# Patient Record
Sex: FEMALE | Race: BLACK OR AFRICAN AMERICAN | NOT HISPANIC OR LATINO | Employment: FULL TIME | URBAN - METROPOLITAN AREA
[De-identification: names, ages, dates, MRNs, and addresses within clinical notes are randomized per-mention and may not be internally consistent; named-entity substitution may affect disease eponyms.]

---

## 2024-12-23 ENCOUNTER — HOSPITAL ENCOUNTER (EMERGENCY)
Facility: HOSPITAL | Age: 59
Discharge: HOME | End: 2024-12-23
Attending: EMERGENCY MEDICINE
Payer: COMMERCIAL

## 2024-12-23 ENCOUNTER — APPOINTMENT (OUTPATIENT)
Dept: RADIOLOGY | Facility: HOSPITAL | Age: 59
End: 2024-12-23
Payer: COMMERCIAL

## 2024-12-23 VITALS
HEART RATE: 63 BPM | WEIGHT: 181 LBS | OXYGEN SATURATION: 99 % | DIASTOLIC BLOOD PRESSURE: 96 MMHG | SYSTOLIC BLOOD PRESSURE: 204 MMHG | HEIGHT: 68 IN | BODY MASS INDEX: 27.43 KG/M2 | TEMPERATURE: 98 F | RESPIRATION RATE: 16 BRPM
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
## 2024-12-23 DIAGNOSIS — K04.7 PERIAPICAL ABSCESS: ICD-10-CM

## 2024-12-23 DIAGNOSIS — R68.84 JAW PAIN: ICD-10-CM

## 2024-12-23 PROCEDURE — 99284 EMERGENCY DEPT VISIT MOD MDM: CPT | Mod: 25 | Performed by: EMERGENCY MEDICINE

## 2024-12-23 PROCEDURE — 70486 CT MAXILLOFACIAL W/O DYE: CPT

## 2024-12-23 PROCEDURE — 2500000001 HC RX 250 WO HCPCS SELF ADMINISTERED DRUGS (ALT 637 FOR MEDICARE OP)

## 2024-12-23 PROCEDURE — 76376 3D RENDER W/INTRP POSTPROCES: CPT

## 2024-12-23 PROCEDURE — 2500000004 HC RX 250 GENERAL PHARMACY W/ HCPCS (ALT 636 FOR OP/ED)

## 2024-12-23 PROCEDURE — 72125 CT NECK SPINE W/O DYE: CPT

## 2024-12-23 PROCEDURE — 96374 THER/PROPH/DIAG INJ IV PUSH: CPT

## 2024-12-23 PROCEDURE — 73610 X-RAY EXAM OF ANKLE: CPT | Mod: LT

## 2024-12-23 PROCEDURE — 70450 CT HEAD/BRAIN W/O DYE: CPT

## 2024-12-23 PROCEDURE — 73610 X-RAY EXAM OF ANKLE: CPT | Mod: LEFT SIDE | Performed by: RADIOLOGY

## 2024-12-23 RX ORDER — ACETAMINOPHEN 325 MG/1
975 TABLET ORAL ONCE
Status: COMPLETED | OUTPATIENT
Start: 2024-12-23 | End: 2024-12-23

## 2024-12-23 RX ORDER — METHOCARBAMOL 500 MG/1
500 TABLET, FILM COATED ORAL 4 TIMES DAILY
Qty: 60 TABLET | Refills: 0 | Status: SHIPPED | OUTPATIENT
Start: 2024-12-23

## 2024-12-23 RX ORDER — METHOCARBAMOL 100 MG/ML
1000 INJECTION, SOLUTION INTRAMUSCULAR; INTRAVENOUS ONCE
Status: COMPLETED | OUTPATIENT
Start: 2024-12-23 | End: 2024-12-23

## 2024-12-23 RX ORDER — PENICILLIN V POTASSIUM 500 MG/1
500 TABLET, FILM COATED ORAL 4 TIMES DAILY
Qty: 28 TABLET | Refills: 0 | Status: SHIPPED | OUTPATIENT
Start: 2024-12-23 | End: 2025-01-01

## 2024-12-23 ASSESSMENT — COLUMBIA-SUICIDE SEVERITY RATING SCALE - C-SSRS
1. IN THE PAST MONTH, HAVE YOU WISHED YOU WERE DEAD OR WISHED YOU COULD GO TO SLEEP AND NOT WAKE UP?: NO
2. HAVE YOU ACTUALLY HAD ANY THOUGHTS OF KILLING YOURSELF?: NO
6. HAVE YOU EVER DONE ANYTHING, STARTED TO DO ANYTHING, OR PREPARED TO DO ANYTHING TO END YOUR LIFE?: NO

## 2024-12-23 ASSESSMENT — PAIN - FUNCTIONAL ASSESSMENT
PAIN_FUNCTIONAL_ASSESSMENT: 0-10
PAIN_FUNCTIONAL_ASSESSMENT: 0-10

## 2024-12-23 ASSESSMENT — PAIN DESCRIPTION - PROGRESSION: CLINICAL_PROGRESSION: NOT CHANGED

## 2024-12-23 ASSESSMENT — PAIN DESCRIPTION - PAIN TYPE: TYPE: ACUTE PAIN

## 2024-12-23 ASSESSMENT — LIFESTYLE VARIABLES
EVER HAD A DRINK FIRST THING IN THE MORNING TO STEADY YOUR NERVES TO GET RID OF A HANGOVER: NO
TOTAL SCORE: 0
HAVE PEOPLE ANNOYED YOU BY CRITICIZING YOUR DRINKING: NO
HAVE YOU EVER FELT YOU SHOULD CUT DOWN ON YOUR DRINKING: NO
EVER FELT BAD OR GUILTY ABOUT YOUR DRINKING: NO

## 2024-12-23 ASSESSMENT — PAIN DESCRIPTION - LOCATION: LOCATION: HEAD

## 2024-12-23 ASSESSMENT — PAIN DESCRIPTION - ORIENTATION: ORIENTATION: POSTERIOR

## 2024-12-23 ASSESSMENT — PAIN SCALES - GENERAL
PAINLEVEL_OUTOF10: 10 - WORST POSSIBLE PAIN
PAINLEVEL_OUTOF10: 3
PAINLEVEL_OUTOF10: 10 - WORST POSSIBLE PAIN

## 2024-12-23 NOTE — ED PROVIDER NOTES
History of Present Illness     History provided by: Patient and EMS  Limitations to History: None  External Records Reviewed with Brief Summary:  previous evaluations at [facility]    HPI:  Akila Cross is a 59 y.o. female past medical history of hypertension TIAs who presents today for mechanical fall.  Patient states that she was standing on a chair attempting to do something on the ceiling when she stepped down off the chair and believes she stepped on one of her grandchildren shoes.  She states that she then felt her foot folded in underneath her and then she fell backward striking her head.  She denies any loss of consciousness, however, did become very very dizzy after this happened.  She states the dizziness seems to be improving, however, she does still have an occipital headache.  She denies any blurry vision, difficulty swallowing or speaking.  She denies any numbness weakness or tingling in arms or legs.  She does endorse some pain in her left ankle from when she rolled it. She denies any other injuries at this time.  She denies any chest pain or shortness of breath prior to her fall, denies any syncopal episode or presyncopal symptoms.    Physical Exam   Triage vitals:  T 36.6 °C (97.8 °F)  HR 62  BP (!) 192/97  RR 16  O2 98 % None (Room air)    Physical Exam  Vitals and nursing note reviewed.   Constitutional:       General: She is not in acute distress.     Appearance: Normal appearance. She is not ill-appearing or diaphoretic.   HENT:      Head: Normocephalic and atraumatic.      Comments: Tenderness to palpation over occiput, no palpable hematoma.  Additionally tenderness to palpation over bilateral TMJs without palpable step-offs or deformities.     Mouth/Throat:      Mouth: Mucous membranes are moist.      Pharynx: No oropharyngeal exudate or posterior oropharyngeal erythema.   Eyes:      General: No scleral icterus.     Extraocular Movements: Extraocular movements intact.      Pupils: Pupils are  equal, round, and reactive to light.   Neck:      Comments: In c-collar, tenderness to palpation to left lateral neck  Cardiovascular:      Rate and Rhythm: Normal rate and regular rhythm.      Pulses: Normal pulses.      Heart sounds: Normal heart sounds. No murmur heard.     No gallop.      Comments: 2+ radial DP and PT pulses bilaterally  Pulmonary:      Effort: Pulmonary effort is normal. No respiratory distress.      Breath sounds: Normal breath sounds. No stridor. No wheezing, rhonchi or rales.   Abdominal:      General: Bowel sounds are normal. There is no distension.      Palpations: Abdomen is soft. There is no mass.      Tenderness: There is no abdominal tenderness. There is no guarding or rebound.      Hernia: No hernia is present.   Musculoskeletal:         General: No swelling, deformity or signs of injury. Normal range of motion.      Cervical back: Normal range of motion and neck supple.      Comments: Tenderness to palpation over anterior aspect of lateral malleolus 5 out of 5 strength in dorsiflexion plantarflexion hip flexion bilaterally, 5 out of 5 strength in handgrip elbow flexion extension bilaterally.   Skin:     General: Skin is warm.      Capillary Refill: Capillary refill takes less than 2 seconds.      Findings: No erythema, lesion or rash.   Neurological:      General: No focal deficit present.      Mental Status: She is alert and oriented to person, place, and time. Mental status is at baseline.      Cranial Nerves: No cranial nerve deficit.      Motor: No weakness.   Psychiatric:         Mood and Affect: Mood normal.         Behavior: Behavior normal.          Medical Decision Making & ED Course   Medical Decision Makin y.o. female past medical history of hypertension and TIA who presents today for mechanical fall.  Given the fact the patient was having facial pain, we will plan to get a CT maxillofacial.  Given that we are getting a CT of her face, I will also get a CT of her  head as well as her C-spine given that these are the only other places that the patient is expressing pain.  I will also get an x-ray of the patient's left ankle, though it is likely that she has an ATFL sprain.  Patient's x-ray demonstrates no evidence of fracture, patient CT head does not demonstrate any evidence of acute intracranial hemorrhage.  She has no evidence of a calvarial fracture or facial bone fracture.  Patient does have some incidental evidence of a periapical abscess, which I will refer her to dentistry for. Given this, patient is appropriate for discharge.  All questions were answered prior to discharge, return precaution provided.  ----      Differential diagnoses considered include but are not limited to: Mechanical fall, rule out ICH, rule out ankle fracture     Social Determinants of Health which Significantly Impact Care: None identified     EKG Independent Interpretation: EKG not obtained    Independent Result Review and Interpretation: Relevant laboratory and radiographic results were reviewed and independently interpreted by myself.  As necessary, they are commented on in the ED Course.    Chronic conditions affecting the patient's care: As documented above in [facility]    The patient was discussed with the following consultants/services: None    Care Considerations: As documented above in [facility]    ED Course:  Diagnoses as of 12/23/24 1837   Fall, initial encounter   Jaw pain   Periapical abscess     Disposition   As a result of the work-up, the patient was discharged home.  she was informed of her diagnosis and instructed to come back with any concerns or worsening of condition.  she and was agreeable to the plan as discussed above.  she was given the opportunity to ask questions.  All of the patient's questions were answered.    Procedures   Procedures    Patient seen and discussed with ED attending physician.    Mono Parker MD  Emergency Medicine    Mono Parker MD  Resident  12/23/24  1856  -------------------------------------------------------    This patient was seen by the resident physician. I have seen and examined the patient, agree with the workup, evaluation, management and diagnosis. The care plan has been discussed and I concur.    My assessment reveals the following:    HPI:  Patient is a 60 y/o female who was standing on a chair to get ready for the holidays and fell off, landing on the shoe of one of her grandchildren, causing her to hit her head on the floor. No LOC. No on blood thinners. C/o right sided neck pain as well as facial pain. No CP/SOB/N/V/abd pain. Did invert her left ankle. No back pain. No paresthesias.     PE:  Vital signs reviewed in nursing triage note, EMR flowsheets, and at patient's bedside  GEN: Patient is awake, alert, calm, cooperative, and in mild painful distress.  HEAD: Normocephalic and atraumatic.  EYES: Anicteric sclera. PERRL.   MOUTH: Mucous membranes moist.  NECK: No midline c-spine TTP. Mild right paravertebral TTP.   CV: Regular rate and rhythm. (+) s1/s2. No murmurs/rubs/gallops.  PULM: CTAB. No wheezes, rales, or crackles.  GI: Soft, non-tender, non-distended without rebound or guarding.  BACK: No midline LS spine tenderness to palpation. No LS paravertebral tenderness to palpation. No midline T spine tenderness to palpation. No T spine paravertebral tenderness to palpation.  EXT: No deformities noted. Full range of motion at all major joints. Mild left ankle TTP over lateral malleolus.   NEURO: Moves all extremities. No gross focal deficits. Sensation grossly intact throughout.  SKIN: Warm, dry. No erythema or ecchymosis.    CT head wo IV contrast   Final Result   CT HEAD:   1. No acute intracranial abnormality or calvarial fracture.        CT MAXILLOFACIAL SKELETON:   1. No acute facial bone fracture.   2. Multiple dental caries with left maxillary 2nd molar periapical   lucency which could represent abscess.        CT CERVICAL SPINE:   1. No  acute fracture or traumatic malalignment of the cervical spine.   2. Spondylotic changes of the cervical spine as detailed above.        I personally reviewed the images/study and I agree with the findings   as stated by Dr. Davion Jean. This study was interpreted at   [place], [place].        MACRO:   None.        Signed by: Lester Katz 12/23/2024 6:41 PM   Dictation workstation:   [id number]      CT maxillofacial bones wo IV contrast   Final Result   CT HEAD:   1. No acute intracranial abnormality or calvarial fracture.        CT MAXILLOFACIAL SKELETON:   1. No acute facial bone fracture.   2. Multiple dental caries with left maxillary 2nd molar periapical   lucency which could represent abscess.        CT CERVICAL SPINE:   1. No acute fracture or traumatic malalignment of the cervical spine.   2. Spondylotic changes of the cervical spine as detailed above.        I personally reviewed the images/study and I agree with the findings   as stated by Dr. Davion Jean. This study was interpreted at   [place], [place].        MACRO:   None.        Signed by: Lester Katz 12/23/2024 6:41 PM   Dictation workstation:   [id number]      CT cervical spine wo IV contrast   Final Result   CT HEAD:   1. No acute intracranial abnormality or calvarial fracture.        CT MAXILLOFACIAL SKELETON:   1. No acute facial bone fracture.   2. Multiple dental caries with left maxillary 2nd molar periapical   lucency which could represent abscess.        CT CERVICAL SPINE:   1. No acute fracture or traumatic malalignment of the cervical spine.   2. Spondylotic changes of the cervical spine as detailed above.        I personally reviewed the images/study and I agree with the findings   as stated by Dr. Davion Jean. This study was interpreted at   [place], [place].        MACRO:   None.         Signed by: Lester Katz 12/23/2024 6:41 PM   Dictation workstation:   [id number]      XR ankle left 3+ views   Final Result   No findings of an acute process.   Signed by James Osborne MD          Medical Decision Making:  - Pain meds  - Left ankle x-ray  - CT head/c-spine/face without contrast  - Dental referral for periapical abscesses as well as abx  - Air cast and crutches for ankle sprain  - Patient advised to follow up with PMD in 3-5 days.   - Patient advised to return to the ED for any worsening or new symptoms.     Differential Diagnoses Considered: Ankle fracture vs sprain, Fall, CHI, ICH, Cervical strain vs fracture, Facial fracture vs contusion    Chronic Medical Conditions Significantly Affecting Care: HTN, TIA    Escalation of Care:  Appropriate for outpatient management    Prescription Drug Consideration: HAILEY Barajas MD Jeffrey H Luk, MD  12/23/24 2052

## 2024-12-23 NOTE — DISCHARGE INSTRUCTIONS
You were seen today after you had a fall.  You do not appear to have any injuries on exam.  You can ambulate on your ankle unless it causes you pain, otherwise we can provide you with crutches.  Additionally, we did incidentally find a periapical abscess, which is an infection around your tooth.  You should see a dentist regarding this. I will send you with a short prescription for an oral antibiotic to help with this until you are able to see a dentist. If begin to have any numbness weakness or tingling, please return here for further treatment and evaluation.

## 2024-12-23 NOTE — ED TRIAGE NOTES
Patient arrived to the ED from [facility] after falling off of a stool in her kitchen. Patient hit the back (posterior) of her head and did not lose consciousness.

## 2024-12-24 PROCEDURE — RXMED WILLOW AMBULATORY MEDICATION CHARGE

## 2024-12-25 ENCOUNTER — PHARMACY VISIT (OUTPATIENT)
Dept: PHARMACY | Facility: CLINIC | Age: 59
End: 2024-12-25
Payer: COMMERCIAL

## 2024-12-25 PROCEDURE — RXOTC WILLOW AMBULATORY OTC CHARGE

## 2025-01-06 ENCOUNTER — HOSPITAL ENCOUNTER (OUTPATIENT)
Dept: RADIOLOGY | Facility: CLINIC | Age: 60
Discharge: HOME | End: 2025-01-06
Payer: COMMERCIAL

## 2025-01-06 DIAGNOSIS — Z12.31 SCREENING MAMMOGRAM FOR BREAST CANCER: ICD-10-CM

## 2025-01-06 PROCEDURE — 77067 SCR MAMMO BI INCL CAD: CPT

## 2025-01-06 PROCEDURE — 77067 SCR MAMMO BI INCL CAD: CPT | Performed by: STUDENT IN AN ORGANIZED HEALTH CARE EDUCATION/TRAINING PROGRAM

## 2025-01-06 PROCEDURE — 77063 BREAST TOMOSYNTHESIS BI: CPT | Performed by: STUDENT IN AN ORGANIZED HEALTH CARE EDUCATION/TRAINING PROGRAM
